# Patient Record
Sex: FEMALE | Race: WHITE | NOT HISPANIC OR LATINO
[De-identification: names, ages, dates, MRNs, and addresses within clinical notes are randomized per-mention and may not be internally consistent; named-entity substitution may affect disease eponyms.]

---

## 2022-10-18 PROBLEM — Z00.00 ENCOUNTER FOR PREVENTIVE HEALTH EXAMINATION: Status: ACTIVE | Noted: 2022-10-18

## 2022-10-19 ENCOUNTER — APPOINTMENT (OUTPATIENT)
Dept: PHYSICAL MEDICINE AND REHAB | Facility: CLINIC | Age: 22
End: 2022-10-19

## 2022-10-19 VITALS — BODY MASS INDEX: 20.4 KG/M2 | HEIGHT: 67 IN | WEIGHT: 130 LBS

## 2022-10-19 PROCEDURE — 99205 OFFICE O/P NEW HI 60 MIN: CPT

## 2022-10-19 RX ORDER — MELOXICAM 5 MG/1
5 CAPSULE ORAL DAILY
Qty: 30 | Refills: 0 | Status: ACTIVE | COMMUNITY
Start: 2022-10-19 | End: 1900-01-01

## 2022-10-19 NOTE — HISTORY OF PRESENT ILLNESS
[FreeTextEntry1] : Patient is a 22-year-old female with a history of scoliosis patient reports in the past she has received physical therapy approximately which has been beneficial.  Patient states over the past few months her mid to low back pain has been more persistent and progressive in nature.\par Patient presents to my office today for evaluation of mid to low back pain her pain is aggravated with bending twisting and prolonged standing.

## 2022-10-19 NOTE — PHYSICAL EXAM
[FreeTextEntry1] : EXAMINATION OF LUMBAR SPINE & LOWER EXTREMITIES: \par \par Upon Inspection:\par Thoraco Lumbar Scoliosis\par \par \par Reflexes (R) L/E:\par                   Quadriceps 2\par                   Achilles 2\par Reflexes (L) L/E:\par                    Quadriceps 2\par                    Achilles 2\par \par Sensory L/E: Intact \par \par [x ] Peripheral Pulses Bilateral L/E\par \par No Gross atrophy \par \par Testing: Harry’s (R) [- ]\par               Harry’s (L) [- ]\par               Babinski down going  [ bilaterally]\par               Chaddock- [ bilaterally]\par               O[place]heim -[ bilaterally]\par               Gonda- [ bilaterally]\par               Clonus -[ ankle bilaterally]\par               Leseague’s (R) [- ]\par               Leseague’s (L) [- ]\par               Kemps [- ]\par SI Jt Lig.Challenege Test (R)  -\par SI Jt Lig.Challenege Test (L) +\par S.L.R. ( R )- 70 degrees\par S.L.R. ( L )-70 degrees\par Range of Motion:\par                           Flexion:   60º (normal 75-90°)\par                           Extension:   25º (normal  30°)\par                           Lateral Bending ( R ):   35º (normal  35°)\par                           Lateral Bending ( L ):   30º (normal  35°)\par                           Thoracic Rotation ( R ):  20º (normal  35°)\par                           Thoracic Rotation ( L ):  25 º (normal  35°)\par                           Internal Rotation Femur ( R ): WNL\par                           External Rotation Femur ( R ): WNL \par                           Internal Rotation Femur ( L ): WNL \par                           External Rotation Femur ( L ): WNL \par Vibratory: Intact \par Proprioception: Intact \par MMT: Intact \par Palpation of the Lumbar Spine: Tenderness involving the mid to low thoracic spine with associated paraspinal muscle spasm.  tenderness involving the L4/L5 and L5/S1 interspace. Tenderness involving the bilateral SI joints. Trigger points involving the bilateral gluteal musculature  with greater involvement on the left. \par \par Measurements : Anterior iliac spine to the medial malleolus (R) 89 cm \par                                                                                               (L)  88 cm \par \par \par \par EXAMINATION OF THE CERVICAL SPINE AND UPPER EXTREMITIES: \par Upon inspection left scapula is elevated compared to the right \par Cranial nerve testing was intact.  Smell and taste were not tested. \par Visual fields were full.  \par There was no difficulty with finger to nose response.  Good rapid alternating digits of the hands bilaterally. \par Romberg testing was negative.  \par There was no dysmetria of the upper extremities. \par Reflexes revealed 2 and symmetrical   \par MMT U/E: intact \par Sensory examination revealed sensation was intact.\par Vibratory and proprioceptive testing were intact.  \par Peripheral pulses were palpable bilaterally.  \par Garcia Test was negative bilaterally.  \par Tinels Test was negative at the wrists bilaterally.  \par The Spurling Cervical Compression Test was negative.  \par The Adson’s Maneuver was negative bilaterally.  \par No scapular winging was noted.\par  There was good scapular mobility.  \par \par Range of motion testing was performed with the use of a goniometer.  \par On range of motion testing, \par Flexion was to 45º (normal - 45º)\par Extension was to 50º (normal - 55º)\par Right rotation was to 70º (normal - 70º)\par Left rotation was to 65º (normal - 70º)\par Right lateral bending was to 40º (normal - 40º)\par Left lateral bending was to 35º (normal - 40º)\par \par On palpation, of the cervical spine there is increased tone and tenderness involving the left trapezius. \par \par

## 2022-10-19 NOTE — ASSESSMENT
[FreeTextEntry1] : X-ray Scoliosis series compare to prior study \par \par Mobic 15 mg po qd with meal #30 \par \par PT\par 2x-3xweek/ 6weeks \par MH, ES, DTM- T/S & L/S \par Trapezii and rhomboid stretch and strengthening\par Scapula mobilization \par Scoliotic derotational exercises \par ANDREA FLEXION, GLUTEAL & PIRIFORMIS STRETCHING\par HAMSTRING STRETCHING & STRENGTHENING  \par QUAD & HAMSTRING STRETCHING & STRENGTHENING\par CORE AND L/E- PRE'S\par INSTRUCTION IN HEP\par \par Recheck in 4 to 6 weeks \par

## 2022-10-24 ENCOUNTER — RESULT REVIEW (OUTPATIENT)
Age: 22
End: 2022-10-24

## 2022-11-30 ENCOUNTER — APPOINTMENT (OUTPATIENT)
Dept: PHYSICAL MEDICINE AND REHAB | Facility: CLINIC | Age: 22
End: 2022-11-30

## 2023-02-08 ENCOUNTER — APPOINTMENT (OUTPATIENT)
Dept: PHYSICAL MEDICINE AND REHAB | Facility: CLINIC | Age: 23
End: 2023-02-08
Payer: COMMERCIAL

## 2023-02-08 DIAGNOSIS — M62.838 OTHER MUSCLE SPASM: ICD-10-CM

## 2023-02-08 PROCEDURE — 99213 OFFICE O/P EST LOW 20 MIN: CPT

## 2023-02-08 NOTE — PHYSICAL EXAM
[FreeTextEntry1] : EXAMINATION OF CERVICAL SPINE: \par \par Flexion:  45° \par Extension:  50°\par Lateral Bend: R: 35° \par                        L:  35°\par Rotation: R:   70°\par                L:   70°\par \par Palpation cervical spine: Decreased tenderness and spasm left trapezii and cervical paraspinal musculature. \par MMT U/E: intact \par Reflexes: 2 and symmetrical throughout \par \par \par EXAMINATION OF LUMBAR SPINE:\par  \par Flexion:  65° \par Extension:  20°\par Lateral Bend: R: 35° \par                        L:  35°\par  \par Palpation of the Lumbar Spine: Trigger points bilateral gluteal musculature. Decreased tenderness and spasm mid thoracic and lower paraspinal musculature. \par \par MMT L/E: intact \par \par Reflexes: 2 and symmetrical throughout \par \par \par

## 2023-02-08 NOTE — ASSESSMENT
[FreeTextEntry1] : Consult with Dr. Langley. Evaluate scoliosis \par \par PT\par 2x-3xweek/ 6weeks \par MH, ES, DTM- T/S & L/S \par Trapezii and rhomboid stretch and strengthening\par Scapula mobilization \par Scoliotic derotational exercises \par ANDREA FLEXION, GLUTEAL & PIRIFORMIS STRETCHING\par HAMSTRING STRETCHING & STRENGTHENING  \par QUAD & HAMSTRING STRETCHING & STRENGTHENING\par CORE AND L/E- PRE'S\par INSTRUCTION IN HEP\par \par Recheck in 4 to 6 weeks \par

## 2023-02-08 NOTE — HISTORY OF PRESENT ILLNESS
[FreeTextEntry1] : Pt reports overall improvement with Mobic and PT. Pt wishes to continue with PT at the current time as she continues to find it beneficial in terms of decreasing pain and allowing for an increase level of function and decreased use of pharmaceutical agents. Xray results reviewed with pt.

## 2023-03-24 ENCOUNTER — APPOINTMENT (OUTPATIENT)
Dept: ORTHOPEDIC SURGERY | Facility: CLINIC | Age: 23
End: 2023-03-24
Payer: COMMERCIAL

## 2023-03-24 VITALS — HEIGHT: 67 IN | BODY MASS INDEX: 20.4 KG/M2 | WEIGHT: 130 LBS

## 2023-03-24 DIAGNOSIS — Z87.39 PERSONAL HISTORY OF OTHER DISEASES OF THE MUSCULOSKELETAL SYSTEM AND CONNECTIVE TISSUE: ICD-10-CM

## 2023-03-24 DIAGNOSIS — M70.61 TROCHANTERIC BURSITIS, RIGHT HIP: ICD-10-CM

## 2023-03-24 DIAGNOSIS — Z78.9 OTHER SPECIFIED HEALTH STATUS: ICD-10-CM

## 2023-03-24 PROCEDURE — 99244 OFF/OP CNSLTJ NEW/EST MOD 40: CPT

## 2023-03-24 PROCEDURE — 72082 X-RAY EXAM ENTIRE SPI 2/3 VW: CPT

## 2023-03-27 PROBLEM — Z78.9 NON-SMOKER: Status: ACTIVE | Noted: 2023-03-24

## 2023-03-27 PROBLEM — Z87.39 HISTORY OF SCOLIOSIS: Status: RESOLVED | Noted: 2023-03-24 | Resolved: 2023-03-27

## 2023-03-27 NOTE — HISTORY OF PRESENT ILLNESS
[Gradual] : gradual [7] : 7 [8] : 8 [Localized] : localized [Sharp] : sharp [Tightness] : tightness [Intermittent] : intermittent [Heat] : heat [Physical therapy] : physical therapy [Sitting] : sitting [Part time] : Work status: part time [de-identified] : 3/24/23: Patient presenting for an initial evaluation of scoliosis at the request of Dr. Conti. Diagnosed with scoliosis for approximately 10 years. She received periodic xrays, no bracing. Chief complaint is diffuse lumbar pain. Back pain present for 6/7 years. Treatment wise, she reports relief from formal PT trial. She reports aggravated symptoms after d/c PT due to authorization issues. Medicine wise, occasionally using Advil and muscle relaxer. She states pain is not limiting. Pain is worse while sedentary. Tolerable while moving, stretching. No pain, numbness/tingling, or changes in strength in the lower extremities b/l. General medical health is good.  [] : no [FreeTextEntry5] : pain started about 7 yrs ago with no cause of injury, diagnosed with scoliosis [FreeTextEntry6] : pressure [FreeTextEntry9] : advil, muscle relaxer [de-identified] : not moving  [de-identified] : orthopedic [de-identified] : xr c, t & l-spine done at  [de-identified] : [employer]

## 2023-03-27 NOTE — DISCUSSION/SUMMARY
[de-identified] : 21 y/o F with scoliosis. \par XRAY from 10/2022 measuring 32 degrees unchanged thoracic levoscoliosis. 39 degrees thoracolumbar rotatory dextroscoliosis, increased 5 degrees when compared to April 2021 xray. \par In office x-rays Thoracic/Lumbar spine ap/lat demonstrates LOOMIS angle  T10 - L4 measuring 48 degrees. T4-T10 is 35 degrees. I discussed with the patient to seriously consider surgery because she has a progressive curve approaching close to 50 degrees with documented progression over the past 2 years. \par Patient was provided with a referral for lumbar physical therapy to work on stretching, strengthening and range of motion.\par \par Prior to appointment and during encounter with patient extensive medical records were reviewed including but not limited to, hospital records, outpatient records, imaging results, and lab data.During this appointment the patient was examined, diagnoses were discussed and explained in a face to face manner. In addition extensive time was spent reviewing aforementioned diagnostic studies. Counseling including abnormal image results, differential diagnoses, treatment options, risk and benefits, lifestyle changes, current condition, and current medications was performed. Patient's comments, questions, and concerns were addressed and patient verbalized understanding. Based on this patient's presentation at our office, which is an orthopedic spine surgeon's office, this patient inherently / intrinsically has a risk, however minute, of developing issues such as Cauda equina syndrome, bowel and bladder changes, or progression of motor or neurological deficits such as paralysis which may be permanent.\par \par YASMIN TRINH Acting as a Scribe for Dr. Langley\par I, Yasmin Trinh, attest that this documentation has been prepared under the direction and in the presence of Provider Dillon Langley MD.

## 2023-03-27 NOTE — PHYSICAL EXAM
[NL (90)] : forward flexion 90 degrees [NL (30)] : right lateral rotation 30 degrees [NL (45)] : extension 45 degrees [NL (40)] : right lateral bending 40 degrees [Extension] : extension [5___] : right extensor hallicus longus 5[unfilled]/5 [Normal Coordination] : normal coordination [Normal DTR UE/LE] : normal DTR UE/LE  [Normal Sensation] : normal sensation [Normal Mood and Affect] : normal mood and affect [Orientated] : orientated [Able to Communicate] : able to communicate [Normal Skin] : normal skin [No Rash] : no rash [No Ulcers] : no ulcers [No Lesions] : no lesions [No obvious lymphadenopathy in areas examined] : no obvious lymphadenopathy in areas examined [Well Developed] : well developed [Peripheral vascular exam is grossly normal] : peripheral vascular exam is grossly normal [No Respiratory Distress] : no respiratory distress [de-identified] : Constitutional:\par - General Appearance:\par Unremarkable\par Body Habitus\par Well Developed\par Well Nourished\par Body Habitus\par No Deformities\par Well Groomed\par Ability To communicate:\par Normal\par Neurologic:\par Global sensation is intact to upper and lower extremities. See examination of Neck and/or Spine\par for exceptions.\par Orientation to Time, Place and Person is: Normal\par Mood And Affect is Normal\par Skin:\par - Head/Face, Right Upper/Lower Extremity, Left Upper/Lower Extremity: Normal\par See Examination of Neck and/or Spine for exceptions\par Cardiovascular:\par Peripheral Cardiovascular System is Normal\par Palpation of Lymph Nodes:\par Normal Palpation of lymph nodes in: Axilla, Cervical, Inguinal\par Abnormal Palpation of lymph nodes in: None  [] : non-antalgic [FreeTextEntry8] : lumbar paraspinal pain

## 2023-03-27 NOTE — DATA REVIEWED
[I independently reviewed and interpreted images and report] : I independently reviewed and interpreted images and report [I reviewed the films/CD and additionally noted] : I reviewed the films/CD and additionally noted [FreeTextEntry1] : [facility] scoliosis xray (10/2022) measuring  32 degrees unchanged thoracic levoscoliosis \par 39 degrees thoracolumbar rotatory dextroscoliosis, increased 5 degrees  [FreeTextEntry2] : In office xrays full length scoliosis thoracic and lumbar ap lat demonstrate Carbajal angle T10-L4 48 degrees T4-T10 35 degrees

## 2024-04-22 ENCOUNTER — APPOINTMENT (OUTPATIENT)
Dept: ORTHOPEDIC SURGERY | Facility: CLINIC | Age: 24
End: 2024-04-22
Payer: COMMERCIAL

## 2024-04-22 VITALS — WEIGHT: 130 LBS | HEIGHT: 67 IN | BODY MASS INDEX: 20.4 KG/M2

## 2024-04-22 DIAGNOSIS — Z86.59 PERSONAL HISTORY OF OTHER MENTAL AND BEHAVIORAL DISORDERS: ICD-10-CM

## 2024-04-22 DIAGNOSIS — M41.9 SCOLIOSIS, UNSPECIFIED: ICD-10-CM

## 2024-04-22 PROCEDURE — ZZZZZ: CPT

## 2024-04-22 RX ORDER — ESCITALOPRAM OXALATE 10 MG/1
10 TABLET, FILM COATED ORAL
Refills: 0 | Status: ACTIVE | COMMUNITY

## 2024-04-22 NOTE — DATA REVIEWED
[I independently reviewed and interpreted images and report] : I independently reviewed and interpreted images and report [I reviewed the films/CD and additionally noted] : I reviewed the films/CD and additionally noted [Thoracic Spine] : thoracic spine [Lumbar Spine] : lumbar spine [FreeTextEntry2] : In office xrays full length scoliosis thoracic and lumbar ap lat demonstrate Carbajal angle T10-L4 48 degrees T4-T10 35 degrees [FreeTextEntry3] : 04/22/2024: In office x-rays Thoracic/Lumbar spine ap/lat 04/22/2024: demonstrate scoliotic curve from t10-L4 measures 45 degrees, reduced from 48 degrees from previous x-rays. Scoliotic curve from T4-T10 measures 35 degrees which is similar to previous x-rays.  [FreeTextEntry1] : I stop paperwork reviewed

## 2024-04-22 NOTE — DISCUSSION/SUMMARY
[de-identified] : 24 y/o F with scoliosis.  XRAY from 10/2022 measuring 32 degrees unchanged thoracic levoscoliosis. 39 degrees thoracolumbar rotatory dextroscoliosis, increased 5 degrees when compared to April 2021 xray.  In office x-rays Thoracic/Lumbar spine ap/lat demonstrates LOOMIS angle  T10 - L4 measuring 48 degrees. T4-T10 is 35 degrees. I discussed with the patient to seriously consider surgery because she has a progressive curve approaching close to 50 degrees with documented progression over the past 2 years.  In office x-rays Thoracic/Lumbar spine ap/lat 04/22/2024: demonstrate scoliotic curve from t10-L4 measures 45 degrees, reduced from 48 degrees from previous x-rays. Scoliotic curve from T4-T10 measures 35 degrees which is similar to previous x-rays. No apparent progression of scoliotic curve over past year. Discussed continued observation for now but agrred to remain vigilant with respect to potential progression of curve magnitude. Patient was provided with a referral for lumbar physical therapy to work on stretching, strengthening and range of motion. Follow up in 1 year and will take new scoliosis X-ray at that time. Cumulative encounter duration exceeded 30 minutes..   Prior to appointment and during encounter with patient extensive medical records were reviewed including but not limited to, hospital records, outpatient records, imaging results, and lab data.During this appointment the patient was examined, diagnoses were discussed and explained in a face to face manner. In addition extensive time was spent reviewing aforementioned diagnostic studies. Counseling including abnormal image results, differential diagnoses, treatment options, risk and benefits, lifestyle changes, current condition, and current medications was performed. Patient's comments, questions, and concerns were addressed and patient verbalized understanding. Based on this patient's presentation at our office, which is an orthopedic spine surgeon's office, this patient inherently / intrinsically has a risk, however minute, of developing issues such as Cauda equina syndrome, bowel and bladder changes, or progression of motor or neurological deficits such as paralysis which may be permanent.  VALDO SANCHEZ acting as a Scribe for Dr. Shivam SURESH, Valdo Sanchez, attest that this documentation has been prepared under the direction and in the presence of Provider Dillon Langley MD.

## 2024-04-22 NOTE — PHYSICAL EXAM
[Normal Coordination] : normal coordination [Normal DTR UE/LE] : normal DTR UE/LE  [Normal Sensation] : normal sensation [Normal Mood and Affect] : normal mood and affect [Oriented] : oriented [Able to Communicate] : able to communicate [Normal Skin] : normal skin [No Rash] : no rash [No Ulcers] : no ulcers [No Lesions] : no lesions [No obvious lymphadenopathy in areas examined] : no obvious lymphadenopathy in areas examined [Well Developed] : well developed [Peripheral vascular exam is grossly normal] : peripheral vascular exam is grossly normal [No Respiratory Distress] : no respiratory distress [NL (90)] : forward flexion 90 degrees [NL (30)] : right lateral rotation 30 degrees [NL (45)] : extension 45 degrees [NL (40)] : right lateral bending 40 degrees [Extension] : extension [5___] : right extensor hallicus longus 5[unfilled]/5 [de-identified] : Constitutional:\par  - General Appearance:\par  Unremarkable\par  Body Habitus\par  Well Developed\par  Well Nourished\par  Body Habitus\par  No Deformities\par  Well Groomed\par  Ability To communicate:\par  Normal\par  Neurologic:\par  Global sensation is intact to upper and lower extremities. See examination of Neck and/or Spine\par  for exceptions.\par  Orientation to Time, Place and Person is: Normal\par  Mood And Affect is Normal\par  Skin:\par  - Head/Face, Right Upper/Lower Extremity, Left Upper/Lower Extremity: Normal\par  See Examination of Neck and/or Spine for exceptions\par  Cardiovascular:\par  Peripheral Cardiovascular System is Normal\par  Palpation of Lymph Nodes:\par  Normal Palpation of lymph nodes in: Axilla, Cervical, Inguinal\par  Abnormal Palpation of lymph nodes in: None  [] : non-antalgic [FreeTextEntry8] : lumbar paraspinal pain

## 2024-04-22 NOTE — HISTORY OF PRESENT ILLNESS
[4] : 4 [0] : 0 [Student] : Work status: student [de-identified] : 04/22/2024: Patient is here today for a follow up, reports no changes since the last visit. Patient states that she has not yet attended physical therapy due to difficulty with scheduling and obtaining an appointment.   3/24/23: Patient presenting for an initial evaluation of scoliosis at the request of Dr. Conti. Diagnosed with scoliosis for approximately 10 years. She received periodic xrays, no bracing. Chief complaint is diffuse lumbar pain. Back pain present for 6/7 years. Treatment wise, she reports relief from formal PT trial. She reports aggravated symptoms after d/c PT due to authorization issues. Medicine wise, occasionally using Advil and muscle relaxer. She states pain is not limiting. Pain is worse while sedentary. Tolerable while moving, stretching. No pain, numbness/tingling, or changes in strength in the lower extremities b/l. General medical health is good.  [de-identified] : p/t